# Patient Record
Sex: MALE | Race: OTHER | URBAN - METROPOLITAN AREA
[De-identification: names, ages, dates, MRNs, and addresses within clinical notes are randomized per-mention and may not be internally consistent; named-entity substitution may affect disease eponyms.]

---

## 2017-02-11 ENCOUNTER — OFFICE VISIT (OUTPATIENT)
Dept: FAMILY MEDICINE CLINIC | Age: 52
End: 2017-02-11

## 2017-02-11 VITALS
BODY MASS INDEX: 25.95 KG/M2 | HEART RATE: 91 BPM | WEIGHT: 152 LBS | HEIGHT: 64 IN | DIASTOLIC BLOOD PRESSURE: 76 MMHG | SYSTOLIC BLOOD PRESSURE: 108 MMHG | TEMPERATURE: 98.6 F

## 2017-02-11 DIAGNOSIS — R73.9 ELEVATED BLOOD SUGAR: ICD-10-CM

## 2017-02-11 DIAGNOSIS — E11.9 TYPE 2 DIABETES MELLITUS WITHOUT COMPLICATION, WITHOUT LONG-TERM CURRENT USE OF INSULIN (HCC): Primary | ICD-10-CM

## 2017-02-11 LAB — GLUCOSE POC: NORMAL MG/DL

## 2017-02-11 RX ORDER — GLIPIZIDE 10 MG/1
10 TABLET ORAL 2 TIMES DAILY
Qty: 180 TAB | Refills: 0 | Status: SHIPPED | OUTPATIENT
Start: 2017-02-11 | End: 2017-04-28 | Stop reason: SDUPTHER

## 2017-02-11 RX ORDER — METFORMIN HYDROCHLORIDE 1000 MG/1
1000 TABLET ORAL 2 TIMES DAILY WITH MEALS
Qty: 180 TAB | Refills: 0 | Status: SHIPPED | OUTPATIENT
Start: 2017-02-11 | End: 2017-10-19 | Stop reason: SDUPTHER

## 2017-02-11 NOTE — PATIENT INSTRUCTIONS
Aprenda acerca de las pautas alimentarias para diabetes - [ Learning About Diabetes Food Guidelines ]  Instrucciones de cuidado  La planificación de las comidas es importante para el manejo de la diabetes. Ayuda a mantener el azúcar en la jong en el nivel ideal (que usted fija con hensley médico). Usted no tiene que comer alimentos especiales. Puede comer lo mismo que hensley ramandeep, incluso dulces de vez en cuando. Nazia debe prestar atención a la cantidad y la frecuencia con que come ciertos alimentos. Vj vez desee colaborar con un dietista o educador de diabetes certificado (CDE, por carlee siglas en inglés) para que le ayuden a planificar las comidas y los refrigerios. Un dietista o CDE también puede ayudarle a bajar de peso si jennifer es raji de carlee objetivos. ¿Qué debería saber acerca de ingerir carbohidratos? Manejar la cantidad de carbohidratos que ingiere es wenceslao parte importante de la alimentación saludable cuando tiene diabetes. Los carbohidratos se encuentran en muchos alimentos. · Sepa qué alimentos contienen carbohidratos. Y aprenda qué cantidad de carbohidratos contienen los diferentes alimentos. ¨ El pan, los cereales, la pasta y el arroz tienen aproximadamente 15 gramos de carbohidratos por porción. Wenceslao porción equivale a 1 rebanada de pan (1 onza o 28 g), ½ taza de cereal cocido o 1/3 de taza de pasta o arroz cocidos. ¨ Las frutas tienen 15 gramos de carbohidratos por porción. Arsenio Settle porción es 1 fruta fresca pequeña, kian wenceslao [place] o wenceslao naranja; ½ banana (plátano); ½ taza de fruta cocida o enlatada; ½ taza de jugo de fruta; 1 taza de melón o frambuesas; o 2 cucharadas de frutas secas. ¨ La leche y el yogur sin azúcar agregado tienen 15 gramos de carbohidratos por porción. Arsenio Settle porción es 1 taza de [place] o 2/3 taza de yogur sin azúcar agregado. ¨ Las verduras con almidón tienen 15 gramos de carbohidratos por porción.  Arsenio Settle porción es ½ taza de puré de papa o camote (batata, celsoiato); 1 taza de calabacín; ½ papa horneada pequeña; ½ taza de frijoles cocidos; o ½ taza de maíz (elote) o arvejas (chícharos) cocidos. · Aprenda cuántos carbohidratos debe consumir cada día y en cada comida. Un dietista o CDE le puede enseñar cómo llevar la cuenta de los carbohidratos que consume. A esto se le llama recuento de carbohidratos. · Si no está seguro de cómo [employer] gramos de carbohidratos, utilice el Método del [place] para planificar las comidas. Es wenceslao Larraine Cortes y rápida de asegurarse de que consuma comidas equilibradas. También le ayuda a distribuir los carbohidratos fay el día. ¨ Divida el plato por tipo de alimento. Llene medio plato con verduras sin almidón, ponga carne u otras proteínas en wenceslao cuarta parte del plato y granos o verduras con almidón en el último cuarto del plato. A esto puede agregarle un pequeño pedazo de fruta y 3 taza de [place] o yogur, según la cantidad de carbohidratos que deba consumir en wenceslao comida. · Trate de comer aproximadamente la misma cantidad de carbohidratos en cada comida. No \"reserve\" hensley cantidad diaria de carbohidratos para consumirlos en wenceslao raiza comida. · Las proteínas contienen muy pocos o nada de carbohidratos por porción. Los ejemplos de proteínas incluyen carne de res, Rosalinda heights, [place], [place], [place], tofu, Geoffrey-barre, requesón (\"cottage cheese\") y la mantequilla de cacahuate [place]). Wenceslao porción de carne son 3 onzas (85 g), lo cual es aproximadamente del tamaño de wenceslao baraja de naipes. Los ejemplos de porciones de sustitutos de la carne (equivalente a 1 onza o 28 g de carne) son 1/4 de taza de requesón, 1 huevo, 1 cucharada de [place] de cacahuate y ½ taza de tofu. ¿Cómo puede comer fuera y aún así comer de modo saludable? · Aprenda a calcular los tamaños de las porciones de alimentos que contienen carbohidratos. Si mide la comida en casa, será más fácil calcular la cantidad en wenceslao porción de comida de restaurante.   · Si el platillo que pide contiene demasiados carbohidratos (kian laz, maíz o frijoles al horno), pida un alimento bajo en carbohidratos en walker lugar. Pida wenceslao ensalada o verduras. · Si [place] insulina, revise walker azúcar en la jong antes y después de comer fuera para ayudarle a planear cuánto comer en el futuro. · Si usted come más carbohidratos de lo planeado en wenceslao comida, dé un paseo o vinita otro tipo de ejercicio. Refugio ayudará a reducir el azúcar en la jong. ¿Qué más debería saber? · Limite las grasas saturadas, kian la grasa de la carne y productos lácteos. Esta es wenceslao opción saludable, porque las personas que tienen diabetes tienen un mayor riesgo de enfermedades del corazón. Así que elija gerardo magros de carne y productos lácteos descremados o semidescremados. Utilice aceite de ham o de canola en lugar de mantequilla o manteca al cocinar. · No se salte comidas. Walker nivel de azúcar en la jong puede bajar demasiado si usted se salta comidas y michael insulina o ciertos medicamentos para la diabetes. · Consulte con walker médico antes de beber alcohol. El alcohol puede hacer que walker azúcar en la jong baje demasiado. El alcohol también puede causar wenceslao reacción adversa si usted michael ciertos medicamentos para la diabetes. La atención de seguimiento es wenceslao parte clave de walker tratamiento y seguridad. Asegúrese de hacer y acudir a todas las citas, y llame a walker médico si está teniendo problemas. También es wenceslao buena idea saber los resultados de los exámenes y mantener wenceslao lista de los medicamentos que michael. ¿Dónde puede encontrar más información en inglés? Diana Cabello a [email or web address]-shayla.info/. Lulu Contreras J155 en la búsqueda para aprender más acerca de \"Aprenda acerca de las pautas alimentarias para diabetes - [ Learning About Diabetes Food Guidelines ]. \"  Revisado: 23 mayo, 2016  Versión del contenido: 11.1  © 7151-9885 [employer].  Las instrucciones de cuidado fueron adaptadas bajo licencia por [employer] (which disclaims liability or warranty for this information). Si usted tiene Goodman Bantry afección médica o sobre estas instrucciones, siempre pregunte a hensley profesional de camacho. [facility] niega toda garantía o responsabilidad por hensley uso de esta información.

## 2017-02-11 NOTE — PROGRESS NOTES
Results for orders placed or performed in visit on 02/11/17   AMB POC GLUCOSE BLOOD, BY GLUCOSE MONITORING DEVICE   Result Value Ref Range    Glucose  nonfasting mg/dL     Coordination of Care  1. Have you been to the ER, urgent care clinic since your last visit? Hospitalized since your last visit? No    2. Have you seen or consulted any other health care providers outside of the [address] [place] [address] since your last visit? Include any pap smears or colon screening. No    Medications  Medication Reconciliation Performed: yes  Patient does need refills     Learning Assessment Complete?  yes

## 2017-02-11 NOTE — PROGRESS NOTES
Subjective:     Phoebe Her is a 54 y.o. male seen for follow up of diabetes. He also has diabetes. Diabetic Review of Systems - medication compliance: noncompliant much of the time, diabetic diet compliance: noncompliant much of the time, home glucose monitoring: is not performed, further diabetic ROS: no polyuria or polydipsia, no chest pain, dyspnea or TIA's, no numbness, tingling or pain in extremities. Other symptoms and concerns: Pt notes his vision has been poor for the last year. Has never been for dilated eye exam.     There is no problem list on file for this patient. There are no active problems to display for this patient. Current Outpatient Prescriptions   Medication Sig Dispense Refill    glipiZIDE (GLUCOTROL) 5 mg tablet Take 1 Tab by mouth two (2) times a day. michael 1 tab por la boca 2 veces al keaton 60 Tab 1    metFORMIN (GLUCOPHAGE) 1,000 mg tablet Take 1 Tab by mouth two (2) times daily (with meals). 180 Tab 1    naproxen (NAPROSYN) 500 mg tablet Take 1 Tab by mouth two (2) times daily (with meals). 30 Tab 1     No Known Allergies  Past Medical History   Diagnosis Date    Diabetes ([facility] [address]         Lab Results  Component Value Date/Time   Hemoglobin A1c 12.2 06/14/2016 10:51 AM   Glucose 334 06/14/2016 10:51 AM   Glucose  nonfasting 02/11/2017 02:54 PM   Creatinine 0.76 06/14/2016 10:51 AM      No results found for: CHOL, CHOLX, CHLST, CHOLV, HDL, LDL, DLDL, LDLC, DLDLP, TGL, TGLX, TRIGL, [id number], TRIGP, CHHD, CHHDX    Lab Results   Component Value Date/Time    Hemoglobin A1c 12.2 06/14/2016 10:51 AM         Review of Systems  Pertinent items are noted in HPI.     Objective:     Visit Vitals    /76 (BP 1 Location: Right arm, BP Patient Position: Sitting)    Pulse 91    Temp 98.6 °F (37 °C) (Oral)    Ht 5' 3.78\" (1.62 m)    Wt 152 lb (68.9 kg)    BMI 26.27 kg/m2     Appearance: alert, well appearing, and in no distress, oriented to person, place, and time, normal appearing weight and well hydrated. Exam: heart sounds normal rate, regular rhythm, normal S1, S2, no murmurs, rubs, clicks or gallops, chest clear, no hepatosplenomegaly  Lab review: orders written for new lab studies as appropriate; see orders. Assessment/Plan:     diabetes poorly controlled, needs further observation, needs improvement, patient poorly compliant. Diabetic issues reviewed with him: referral to Diabetic Education department, home glucose monitoring emphasized, all medications, side effects and compliance discussed carefully and long term diabetic complications discussed. ICD-10-CM ICD-9-CM    1. Type 2 diabetes mellitus without complication, without long-term current use of insulin (HCC) E11.9 250.00 glipiZIDE (GLUCOTROL) 10 mg tablet      metFORMIN (GLUCOPHAGE) 1,000 mg tablet      HEMOGLOBIN A1C WITH EAG      CBC W/O DIFF      METABOLIC PANEL, COMPREHENSIVE      TSH 3RD GENERATION      LIPID PANEL      MICROALBUMIN, UR, RAND W/ MICROALBUMIN/CREA RATIO      URINALYSIS W/ RFLX MICROSCOPIC   2. Elevated blood sugar R73.9 790.29 AMB POC GLUCOSE BLOOD, BY GLUCOSE MONITORING DEVICE     Strongly counseled Pt on effects of poorly controlled DM 2. 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 Will increase Glipizide to 10mg BID and refill Metformin. Indicated Pt would get 90 day suppy since he needed to follow up  In 4-6 weeks for repeat of blood sugar and return for fasting lab appt. Encouraged to scheduled RD appt. Discussed importance of small regular meals and taking Glipizide with food. Refer for eye exam at f/u appt. Pt expressed understanding of the treatment plan and repeated back instructions for medications, labs, referral to RD and follow-up.

## 2017-04-28 DIAGNOSIS — E11.9 TYPE 2 DIABETES MELLITUS WITHOUT COMPLICATION, WITHOUT LONG-TERM CURRENT USE OF INSULIN (HCC): ICD-10-CM

## 2017-05-01 RX ORDER — GLIPIZIDE 10 MG/1
TABLET ORAL
Qty: 60 TAB | Refills: 1 | Status: SHIPPED | OUTPATIENT
Start: 2017-05-01 | End: 2017-10-19 | Stop reason: SDUPTHER

## 2017-05-01 NOTE — TELEPHONE ENCOUNTER
Pt needs to be seen before any more refills approved. Sugars consistently > 300s at [address].

## 2017-05-02 NOTE — TELEPHONE ENCOUNTER
Pt called w/ assistance of [employer] phone  #[phone]. Informed the refill was sent to his [address] yesterday for Glipizide 1 month and 1 refill and he needs to return for f/u w/ medical provider. Pt stated he was unable to keep his Mar appt because of work and he is planning to try to come on Sat 5/ 6/17 to have labs drawn and see the provider . Reviewed CAV line start times vs clinic start times.

## 2017-05-06 ENCOUNTER — HOSPITAL ENCOUNTER (OUTPATIENT)
Dept: LAB | Age: 52
Discharge: HOME | End: 2017-05-06

## 2017-05-06 ENCOUNTER — CLINICAL SUPPORT (OUTPATIENT)
Dept: FAMILY MEDICINE CLINIC | Age: 52
End: 2017-05-06

## 2017-05-06 DIAGNOSIS — E11.9 TYPE 2 DIABETES MELLITUS WITHOUT COMPLICATION, WITHOUT LONG-TERM CURRENT USE OF INSULIN (HCC): ICD-10-CM

## 2017-05-06 DIAGNOSIS — E11.9 TYPE 2 DIABETES MELLITUS WITHOUT COMPLICATION, WITHOUT LONG-TERM CURRENT USE OF INSULIN (HCC): Primary | ICD-10-CM

## 2017-05-06 LAB
ALBUMIN SERPL BCP-MCNC: 4.1 G/DL (ref 3.5–5)
ALBUMIN/GLOB SERPL: 1.4 {RATIO} (ref 1.1–2.2)
ALP SERPL-CCNC: 100 U/L (ref 45–117)
ALT SERPL-CCNC: 51 U/L (ref 12–78)
ANION GAP BLD CALC-SCNC: 9 MMOL/L (ref 5–15)
APPEARANCE UR: CLEAR
AST SERPL W P-5'-P-CCNC: 14 U/L (ref 15–37)
BACTERIA URNS QL MICRO: NEGATIVE /HPF
BILIRUB SERPL-MCNC: 0.8 MG/DL (ref 0.2–1)
BILIRUB UR QL: NEGATIVE
BUN SERPL-MCNC: 14 MG/DL (ref 6–20)
BUN/CREAT SERPL: 21 (ref 12–20)
CALCIUM SERPL-MCNC: 8.8 MG/DL (ref 8.5–10.1)
CHLORIDE SERPL-SCNC: 104 MMOL/L (ref 97–108)
CHOLEST SERPL-MCNC: 161 MG/DL
CO2 SERPL-SCNC: 23 MMOL/L (ref 21–32)
COLOR UR: ABNORMAL
CREAT SERPL-MCNC: 0.67 MG/DL (ref 0.7–1.3)
CREAT UR-MCNC: 174 MG/DL
EPITH CASTS URNS QL MICRO: ABNORMAL /LPF
ERYTHROCYTE [DISTWIDTH] IN BLOOD BY AUTOMATED COUNT: 12.8 % (ref 11.5–14.5)
EST. AVERAGE GLUCOSE BLD GHB EST-MCNC: 240 MG/DL
GLOBULIN SER CALC-MCNC: 3 G/DL (ref 2–4)
GLUCOSE SERPL-MCNC: 196 MG/DL (ref 65–100)
GLUCOSE UR STRIP.AUTO-MCNC: >1000 MG/DL
HBA1C MFR BLD: 10 % (ref 4.2–6.3)
HCT VFR BLD AUTO: 43.2 % (ref 36.6–50.3)
HDLC SERPL-MCNC: 39 MG/DL
HDLC SERPL: 4.1 {RATIO} (ref 0–5)
HGB BLD-MCNC: 15.1 G/DL (ref 12.1–17)
HGB UR QL STRIP: NEGATIVE
HYALINE CASTS URNS QL MICRO: ABNORMAL /LPF (ref 0–5)
KETONES UR QL STRIP.AUTO: NEGATIVE MG/DL
LDLC SERPL CALC-MCNC: 77.8 MG/DL (ref 0–100)
LEUKOCYTE ESTERASE UR QL STRIP.AUTO: NEGATIVE
LIPID PROFILE,FLP: ABNORMAL
MCH RBC QN AUTO: 32 PG (ref 26–34)
MCHC RBC AUTO-ENTMCNC: 35 G/DL (ref 30–36.5)
MCV RBC AUTO: 91.5 FL (ref 80–99)
MICROALBUMIN UR-MCNC: 8.68 MG/DL
MICROALBUMIN/CREAT UR-RTO: 50 MG/G (ref 0–30)
NITRITE UR QL STRIP.AUTO: NEGATIVE
PH UR STRIP: 6.5 [PH] (ref 5–8)
PLATELET # BLD AUTO: 199 K/UL (ref 150–400)
POTASSIUM SERPL-SCNC: 4.3 MMOL/L (ref 3.5–5.1)
PROT SERPL-MCNC: 7.1 G/DL (ref 6.4–8.2)
PROT UR STRIP-MCNC: ABNORMAL MG/DL
RBC # BLD AUTO: 4.72 M/UL (ref 4.1–5.7)
RBC #/AREA URNS HPF: ABNORMAL /HPF (ref 0–5)
SODIUM SERPL-SCNC: 136 MMOL/L (ref 136–145)
SP GR UR REFRACTOMETRY: 1.02 (ref 1–1.03)
TRIGL SERPL-MCNC: 221 MG/DL (ref ?–150)
TSH SERPL DL<=0.05 MIU/L-ACNC: 0.8 UIU/ML (ref 0.36–3.74)
UROBILINOGEN UR QL STRIP.AUTO: 0.2 EU/DL (ref 0.2–1)
VLDLC SERPL CALC-MCNC: 44.2 MG/DL
WBC # BLD AUTO: 5.4 K/UL (ref 4.1–11.1)
WBC URNS QL MICRO: ABNORMAL /HPF (ref 0–4)

## 2017-05-06 PROCEDURE — 80061 LIPID PANEL: CPT | Performed by: NURSE PRACTITIONER

## 2017-05-06 PROCEDURE — 85027 COMPLETE CBC AUTOMATED: CPT | Performed by: NURSE PRACTITIONER

## 2017-05-06 PROCEDURE — 82043 UR ALBUMIN QUANTITATIVE: CPT | Performed by: NURSE PRACTITIONER

## 2017-05-06 PROCEDURE — 84443 ASSAY THYROID STIM HORMONE: CPT | Performed by: NURSE PRACTITIONER

## 2017-05-06 PROCEDURE — 81001 URINALYSIS AUTO W/SCOPE: CPT | Performed by: NURSE PRACTITIONER

## 2017-05-06 PROCEDURE — 80053 COMPREHEN METABOLIC PANEL: CPT | Performed by: NURSE PRACTITIONER

## 2017-05-06 PROCEDURE — 83036 HEMOGLOBIN GLYCOSYLATED A1C: CPT | Performed by: NURSE PRACTITIONER

## 2017-05-06 NOTE — PROGRESS NOTES
Patient here for fasting labs only, labs drawn was an Urinalysis w/rflx micro, Cmp, Microalbumin, Lipid, Tsh, A1C, CBC w/o diff in the RA, patient left lab with no bleeding, no pain, and feeling well. Patient was advise that a Dr or Nurse will call with the results if abnormal and if normal no phone call will be done. Also the patient was advised she/he can discuss the results at next visit with the Dr. Vaishnavi Lamas, Medical Assistant.

## 2017-05-10 NOTE — PROGRESS NOTES
A1C improved  from 12 last year but still not within goal at 10.0. Diabetes needs improvement. Pt does not have f/u appt scheduled as was mentioned during RN phone call on 5/2 re: refills. Please have Pt return in next 4-6 weeks for DM 2 follow-up.  ,  Thank you,  Melisa Peterson

## 2017-05-12 NOTE — PROGRESS NOTES
I called pt today with [employer]  # [phone] and gave message from provider. Pt wants to return on a Saturday and that is why he did not schedule an appointment. He will return in 4 to 6 weeks for follow up for DM. I did review A1C labs with pt today.  Prema Lerma RN

## 2017-10-19 ENCOUNTER — CLINICAL SUPPORT (OUTPATIENT)
Dept: FAMILY MEDICINE CLINIC | Age: 52
End: 2017-10-19

## 2017-10-19 DIAGNOSIS — E11.9 TYPE 2 DIABETES MELLITUS WITHOUT COMPLICATION, WITHOUT LONG-TERM CURRENT USE OF INSULIN (HCC): ICD-10-CM

## 2017-10-19 DIAGNOSIS — Z71.9 COUNSELED BY NURSE: Primary | ICD-10-CM

## 2017-10-19 RX ORDER — METFORMIN HYDROCHLORIDE 1000 MG/1
1000 TABLET ORAL 2 TIMES DAILY WITH MEALS
Qty: 180 TAB | Refills: 0 | Status: SHIPPED | OUTPATIENT
Start: 2017-10-19 | End: 2019-06-16 | Stop reason: SDUPTHER

## 2017-10-19 RX ORDER — GLIPIZIDE 10 MG/1
10 TABLET ORAL 2 TIMES DAILY
Qty: 180 TAB | Refills: 0 | Status: SHIPPED | OUTPATIENT
Start: 2017-10-19 | End: 2019-06-16

## 2017-10-19 NOTE — TELEPHONE ENCOUNTER
Chart review shows last seen 2/2017 w/ recommendations to return 1-2 months. Will do refills and instructed to schedule a f/u appt. Pt made the line early today but was on waiting and needed to go to work at 1p. Has scheduled f/u appt.

## 2017-10-19 NOTE — PROGRESS NOTES
See refill note. Made line this am , placed on wait list and needs to go to work (1p now). Refills done for 3 months and f/u appt scheduled for Nov 2017. Encouraged to see provider at least every 6 months.

## 2017-11-16 ENCOUNTER — OFFICE VISIT (OUTPATIENT)
Dept: FAMILY MEDICINE CLINIC | Age: 52
End: 2017-11-16

## 2017-11-16 VITALS
TEMPERATURE: 98.2 F | HEIGHT: 64 IN | WEIGHT: 160 LBS | HEART RATE: 86 BPM | SYSTOLIC BLOOD PRESSURE: 114 MMHG | DIASTOLIC BLOOD PRESSURE: 75 MMHG | BODY MASS INDEX: 27.31 KG/M2

## 2017-11-16 DIAGNOSIS — E11.9 TYPE 2 DIABETES MELLITUS WITHOUT COMPLICATION, WITHOUT LONG-TERM CURRENT USE OF INSULIN (HCC): Primary | ICD-10-CM

## 2017-11-16 LAB — GLUCOSE POC: NORMAL MG/DL

## 2017-11-16 NOTE — MR AVS SNAPSHOT
Visit Information [facility] y Yamel Personal Médico Departamento Teléfono del Dep. Número de visita 11/16/2017  2:00 PM Erinn Hdez MD [address] [phone] Follow-up Instructions Return for nutr and nurse start insulin asap, provider 1 month. Upcoming Health Maintenance Date Due  
 FOOT EXAM Q1 5/21/1977 EYE EXAM RETINAL OR DILATED Q1 5/21/1977 Pneumococcal 19-64 Medium Risk (1 of 1 - PPSV23) 5/21/1986 DTaP/Tdap/Td series (1 - Tdap) 5/21/1988 FOBT Q 1 YEAR AGE 50-75 5/21/2017 Influenza Age 5 to Adult 8/1/2017 HEMOGLOBIN A1C Q6M 11/6/2017 MICROALBUMIN Q1 5/6/2018 LIPID PANEL Q1 5/6/2018 Alergias  Review Complete El: 11/16/2017 Por: Gareth Shelley A partir del:  11/16/2017 No Known Allergies Vacunas actuales Marla Sing No hay ninguna vacuna archivada. No revisadas esta visita You Were Diagnosed With   
  
 Manfred Milligan Type 2 diabetes mellitus without complication, without long-term current use of insulin (HCC)    -  Primary ICD-10-CM: E11.9 ICD-9-CM: 250.00 Partes vitales PS Pulso Temperatura [place] ( percentil de crecimiento) Peso (percentil de crecimiento) BMI (IMC)  
 114/75 (BP 1 Location: Right arm) 86 98.2 °F (36.8 °C) (Oral) 5' 3.78\" (1.62 m) 160 lb (72.6 kg) 27.65 kg/m2 Estatus de tabaquísmo Never Smoker Historial de signos vitales BMI and BSA Data Body Mass Index Body Surface Area  
 27.65 kg/m 2 1.81 m 2 [facility] Name Phone [facility], [address] [place] [address] Walker lista de medicamentos actualizada Lista actualizada el: 11/16/17  3:04 PM.  Jennifer Peterson use walker lista de medicamentos más reciente. glipiZIDE 10 mg tablet También conocido kian:  Medina Juliette Take 1 Tab by mouth two (2) times a day. metFORMIN 1,000 mg tablet También conocido kian:  GLUCOPHAGE Take 1 Tab by mouth two (2) times daily (with meals). Hicimos lo siguiente AMB POC GLUCOSE BLOOD, BY GLUCOSE MONITORING DEVICE [13587 CPT(R)] Instrucciones de seguimiento Return for nutr and nurse start insulin asap, provider 1 month. Introducing [facility]! [facility] introduce portal paciente MyChart . Ahora se puede acceder a partes de hensley expediente médico, enviar por correo electrónico la oficina de hensley médico y solicitar renovaciones de medicamentos en línea. En hensley navegador de Internet , Jair Sanchez a [email or web address] [place] clmoses en el usuario por Meghan Gilliland? Salma clay aquí en la sesión Brandi Mcneill. Verá la página de registro [place]. Ingrese hensley código de [facility] lydia y kian aparece a continuación. [place] no tendrá que [employer] código después de bhavik completado el proceso de registro . Si usted no se inscribe antes de la fecha de caducidad , debe solicitar un nuevo código. · MyChart Código de acceso : [id number] Expires: 2/14/2018  3:04 PM 
 
Ingresa los últimos cuatro dígitos de hensley Número de Seguro Social ( xxxx ) y fecha de nacimiento ( dd / mm / aaaa ) kian se indica y vinita clic en Enviar. Cruzito será llevado a la siguiente página de registro . Crear un ID MyChart . Esta será hensley ID de inicio de sesión de MyChart y no puede ser [place] , por lo que pensar en wenceslao que es Wesley Funez y fácil de recordar . Crear wenceslao contraseña MyChart . [place] puede cambiar hensley contraseña en cualquier momento . Ingrese hensley Password Reset de preguntas y [place] . Pelican se puede utilizar en un momento posterior si usted olvida hensley contraseña. Introduzca hensley dirección de correo electrónico . Migel Leahy recibirá wenceslao notificación por correo electrónico cuando la nueva información está disponible en MyChart . Dedra clay en Registrarse. Magdalene Smiles lucía y descargar porciones de hensley expediente médico. 
Alberta obed en el enlace de descarga del menú Resumen para descargar wenceslao copia portátil de hensley información médica . Si tiene [employer] , por favor visite la sección de preguntas frecuentes del sitio web MyChart . Recuerde, MyChart NO es que se utilizará para las necesidades urgentes. Para emergencias médicas , llame al 911 . Ahora disponible en hensley iPhone y Android ! Por favor proporcione imelda resumen de la documentación de cuidado a hensley próximo proveedor. If you have any questions after today's visit, please call [phone].

## 2017-11-16 NOTE — PROGRESS NOTES
Statements below were documented by Juan Carlos Metzger RN My  for this patient visit was Lissy Santo . Patient discharged home with AVS  . No further questions. Explained and showed patient how to draw up saline and inject himself in abdomen . Patient was able to perform self administering of saline without any problems . Booklet given on getting started with insulin in Marshallese . Sent to register to schedule NV for more detailed insulin teaching at later date . PAP application given and reviewed with patient financial documents required in order for submission to  . Patient stated I currently do not work , Informed to bring in wife pay stubs . Follow-up appointments made for Provider and dietician  as well .  Juan Carlos Metzger RN

## 2017-11-16 NOTE — PROGRESS NOTES
Subjective:     Abi Parsons is a 50 y.o. male seen for follow up of diabetes. States that when he takes his meds, it causes pain in his legs. Describes the pain as a sharp pain bilat calves to feet. Review of Systems - medication compliance: compliant most of the time, acute symptoms are absent. Currently taking meds except hasn't picked up lisinopril. .    Other symptoms and concerns: none. Lab Results   Component Value Date/Time    Hemoglobin A1c 10.0 05/06/2017 10:43 AM    Hemoglobin A1c 12.2 06/14/2016 10:51 AM         Objective:     Vitals 11/16/2017 2/11/2017 7/18/2016 6/14/2016   Blood Pressure 114/75 108/76 112/72 117/79   Pulse 86 91 76 75   Temp 98.2 98.6 97.5 97.6   Height 5' 3.78\" 5' 3.78\" 5' 3.858\" -   Weight 160 lb 152 lb 150 lb 153 lb   BSA 1.81 m2 1.76 m2 1.75 m2 -   BMI 27.65 kg/m2 26.27 kg/m2 25.86 kg/m2 -     Appearance: alert, well appearing, and in no distress. Exam: feet are without lesions and has good pulses. Lab review: A1Cs very high on max effective po meds. Assessment/Plan:     Poorly- controlled diabetes. I recommend he start insulin, 10U NPH at hs and continue current po meds. He has 2 concerns:  Taking the injection itself and how to pay for insulin. Discussed that we can probably get the insulin for him free (he isn't working right now), and will have the nurse show him injection today. We also discussed that he would need to check glucoses daily at first, and maybe later a little less often. Asked about the glucometer we rec, and we discussed this. Will have him come in for teaching re diet and insulin and f/u with provider in 1 month.

## 2017-11-16 NOTE — PROGRESS NOTES
Coordination of Care  1. Have you been to the ER, urgent care clinic since your last visit? Hospitalized since your last visit? No    2. Have you seen or consulted any other health care providers outside of the [address] [place] [address] since your last visit? Include any pap smears or colon screening. No    Medications  Does the patient need refills?  YES    Learning Assessment Complete? yes  Results for orders placed or performed in visit on 11/16/17   AMB POC GLUCOSE BLOOD, BY GLUCOSE MONITORING DEVICE   Result Value Ref Range    Glucose POC nf 324 mg/dL

## 2018-02-17 ENCOUNTER — OFFICE VISIT (OUTPATIENT)
Dept: FAMILY MEDICINE CLINIC | Age: 53
End: 2018-02-17

## 2018-02-17 VITALS
SYSTOLIC BLOOD PRESSURE: 128 MMHG | WEIGHT: 151 LBS | HEART RATE: 88 BPM | HEIGHT: 63 IN | TEMPERATURE: 98.7 F | BODY MASS INDEX: 26.75 KG/M2 | DIASTOLIC BLOOD PRESSURE: 72 MMHG

## 2018-02-17 DIAGNOSIS — E11.9 TYPE 2 DIABETES MELLITUS WITHOUT COMPLICATION, WITHOUT LONG-TERM CURRENT USE OF INSULIN (HCC): Primary | ICD-10-CM

## 2018-02-17 PROBLEM — E11.21 TYPE 2 DIABETES WITH NEPHROPATHY (HCC): Status: ACTIVE | Noted: 2018-02-17

## 2018-02-17 LAB — GLUCOSE POC: NORMAL MG/DL

## 2018-02-17 NOTE — PROGRESS NOTES
Pt c/o during clinic about long wait time to numerous staff. Charge nurse was called to see pt and reviewed clinic flow that was discussed in morning announcements w/ clients. Pt informed he must wait patiently and will be seen in order of arrival in line. Radha Barrios further disruptions will result in his dismissal from clinic\". AVS printed and handed to pt.

## 2018-02-17 NOTE — PROGRESS NOTES
Coordination of Care  1. Have you been to the ER, urgent care clinic since your last visit? Hospitalized since your last visit? No    2. Have you seen or consulted any other health care providers outside of the [address] [place] [address] since your last visit? Include any pap smears or colon screening. No    Medications  Does the patient need refills?  YES    Learning Assessment Complete? yes  Results for orders placed or performed in visit on 02/17/18   AMB POC GLUCOSE BLOOD, BY GLUCOSE MONITORING DEVICE   Result Value Ref Range    Glucose POC H non fasting mg/dL

## 2018-02-17 NOTE — PATIENT INSTRUCTIONS
Measure fasting glucoses every morning for 3 days. Use 25 units sc 30 minutes before breakfast and dinner. If any glucoses are below 80, drink 4 ounces juice, wait 15 minutes, measure again. Then if not time to eat a meal, eat a balanced snack.   Lay Ruffin  Hemoglobin A1c   Date Value Ref Range Status   05/06/2017 10.0 (H) 4.2 - 6.3 % Final   06/14/2016 12.2 (H) 4.2 - 6.3 % Final     META: A1c menos de 7.0 (154)    En ayunas (antes de desayuno) META:   2 horas despues de comer META: Menos de 180  Los chekeos de la Glucosa para [address] para checkiar la glucosa - Dág             $16.24     $9.00    A [employer]  ReliOn Prime Meter:  $16.24  Tiritas: 50 tiritas cuestan $9  ReliOn lancing device $5.84  ReliOn lancets 100 for $3.74, 200 for $5.89  ReliOn insulin syringes $12.58 for 100 (necesita receta para jeringes)  ReliOn Glucose Tablets   $1 for a pack of 10 tablets  BD Home Sharps Container $2.96    A [employer]    [employer]ger High Accuracy Glucometer $9.99  Tiritas: 50 tiritas cuestan $18.99  Lancets, 100 cuestan $ 8.99     Updated :  4/7/2014

## 2018-02-17 NOTE — MR AVS SNAPSHOT
[address]lla [address] 1400 [address] [place] [address] 
[phone] Patient: Nestor Newby MRN: [id number] :[id number] Visit Information Adele Demarco Personal Médico Departamento Teléfono del Dep. Número de visita 2018  1:45 PM Yuly Santos, [address] .S.  [phone] Follow-up Instructions Return for diabetes. Upcoming Health Maintenance Date Due  
 FOOT EXAM Q1 1977 EYE EXAM RETINAL OR DILATED Q1 1977 Pneumococcal 19-64 Medium Risk (1 of 1 - PPSV23) 1986 DTaP/Tdap/Td series (1 - Tdap) 1988 FOBT Q 1 YEAR AGE 50-75 2017 Influenza Age 5 to Adult 2017 HEMOGLOBIN A1C Q6M 2017 MICROALBUMIN Q1 2018 LIPID PANEL Q1 2018 Alergias  Review Complete El: 2018 Por: Yuly Santos, KRISTIAN Serrano Duel del:  2018 No Known Allergies Vacunas actuales Jeffrie Plan No hay ninguna vacuna archivada. No revisadas esta visita You Were Diagnosed With   
  
 Kiana Woods Type 2 diabetes mellitus without complication, without long-term current use of insulin (HCC)    -  Primary ICD-10-CM: E11.9 ICD-9-CM: 250.00 Partes vitales PS Pulso Temperatura Collins ( percentil de crecimiento) Peso (percentil de crecimiento) BMI (IMC)  
 128/72 (BP 1 Location: Right arm, BP Patient Position: Sitting) 88 98.7 °F (37.1 °C) (Oral) 5' 3.39\" (1.61 m) 151 lb (68.5 kg) 26.42 kg/m2 Estatus de tabaquísmo Never Smoker Historial de signos vitales BMI and BSA Data Body Mass Index Body Surface Area  
 26.42 kg/m 2 1.75 m 2 [facility] Name Phone [address] [facility], [address] [place] [address] JUVENAL Boyd [facility] [phone] Walker lista de medicamentos actualizada Lista actualizada el: 18  2:52 PM.  Rosibel Alegre use walker lista de medicamentos más reciente. glipiZIDE 10 mg tablet También conocido kian:  Ples Jenkins Take 1 Tab by mouth two (2) times a day. insulin NPH/insulin regular 100 unit/mL (70-30) injection También conocido kian:  NOVOLIN 70/30, HUMULIN 70/30  
25 units sc 30 min ac lunch and dinner  
  
 metFORMIN 1,000 mg tablet También conocido kian:  GLUCOPHAGE Take 1 Tab by mouth two (2) times daily (with meals). Recetas Enviado a la [place] Refills  
 insulin NPH/insulin regular (NOVOLIN 70/30, HUMULIN 70/30) 100 unit/mL (70-30) injection 3 Si units sc 30 min ac lunch and dinner Class: Normal  
 Pharmacy: [facility] [address], [address] [place] [address] #: [phone] Hicimos lo siguiente AMB POC GLUCOSE BLOOD, BY GLUCOSE MONITORING DEVICE [81568 CPT(R)] Instrucciones de seguimiento Return for diabetes. Instrucciones para el Paciente Measure fasting glucoses every morning for 3 days. Use 25 units sc 30 minutes before breakfast and dinner. If any glucoses are below 80, drink 4 ounces juice, wait 15 minutes, measure again. Then if not time to eat a meal, eat a balanced snack. Yuko Mcneil Hemoglobin A1c Date Value Ref Range Status 2017 10.0 (H) 4.2 - 6.3 % Final  
2016 12.2 (H) 4.2 - 6.3 % Final  
 
META: A1c menos de 7.0 (154) En ayunas (antes de desayuno) META:  
2 horas despues de comer META: Menos de [address] [place] para checkiar la glucosa - [employer] 
 
 
   
   $16.24     $9.00 A [facility] ReliOn Prime Meter:  $16.24 Tiritas: 50 tiritas cuestan $9 
ReliOn lancing device $5.84 ReliOn lancets 100 for $3.74, 200 for $5.89 ReliOn insulin syringes $12.58 for 100 (necesita receta para jeringes) ReliOn Glucose Tablets   $1 for a pack of 10 tablets [employer] Home Sharps Container $2.96 Lois Farris Celine High Accuracy Glucometer $9.99 Tiritas: 50 tiritas cuestan $18.99 Lancets, 100 cuestan $ 8.99 Updated :  4/7/2014 Introducing [facility] SERVICES! [facility] introduce portal paciente MyChart . Ahora se puede acceder a partes de hensley expediente médico, enviar por correo electrónico la oficina de hensley médico y solicitar renovaciones de medicamentos en línea. En hensley navegador de Internet , Sussy bauer [email or web address] [place] clic en el usuario por Kati Le? Luda Sudhakar clic aquí en la sesión Zina Pallas. Verá la página de registro Denair. Ingrese hensley código de [facility] Nimisha lydia y kian aparece a continuación. Usted no tendrá que [employer] código después de bhavik completado el proceso de registro . Si usted no se inscribe antes de la fecha de caducidad , debe solicitar un nuevo código. · MyChart Código de acceso : [id number] Expires: 5/18/2018  1:02 PM 
 
Ingresa los últimos cuatro dígitos de hensley Número de Seguro Social ( xxxx ) y fecha de nacimiento ( dd / mm / aaaa ) kian se indica y vinita clic en Enviar. Usted será llevado a la siguiente página de registro . Crear un ID MyChart . Esta será hensley ID de inicio de sesión de MyChart y no puede ser [place] , por lo que pensar en wenceslao que es [place] y fácil de recordar . Crear wenceslao contraseña MyChart . Usted puede cambiar hensley contraseña en cualquier momento . Ingrese hensley Password Reset de preguntas y [place] . [place] se puede utilizar en un momento posterior si usted olvida hensley contraseña. Introduzca hensley dirección de correo electrónico . Darrelyn Dienes recibirá wenceslao notificación por correo electrónico cuando la nueva información está disponible en MyChart . Martita paulinoic en Registrarse. David Bark lucía y descargar porciones de hensley expediente médico. 
Vinita loraic en el enlace de descarga del menú Resumen para descargar wenceslao copia portátil de hensley información médica . Si tiene [employer] , por favor visite la sección de preguntas frecuentes del sitio web MyChart . Recuerde, MyChart NO es que se utilizará para las necesidades urgentes. Para emergencias médicas , llame al 911 . Ahora disponible en hensley iPhone y Android ! Por favor proporcione imelda resumen de la documentación de cuidado a hensley próximo proveedor. If you have any questions after today's visit, please call [phone].

## 2018-02-17 NOTE — PROGRESS NOTES
Assessment/Plan:       ICD-10-CM ICD-9-CM    1. Type 2 diabetes mellitus without complication, without long-term current use of insulin (HCC) E11.9 250.00 AMB POC GLUCOSE BLOOD, BY GLUCOSE MONITORING DEVICE      insulin NPH/insulin regular (NOVOLIN 70/30, HUMULIN 70/30) 100 unit/mL (70-30) injection      Follow-up Disposition:  Return for diabetes. [address], [address]  [address] [place] [address]  Phone: [phone] Fax: [phone]    [facility]  Subjective:     Chief Complaint   Patient presents with    Diabetes     follow up    Medication Refill    Ruthy Giles is a 50 y.o. OTHER male   Diabetes   Brought in medications? no Last ate: today   Last took medications: today Taking as prescribed? yes   Working: yes   Physical Activity? yes  Measuring glucoses? yes   Agreeable to starting insulin. Outpatient Medications Prior to Visit   Medication Sig Dispense Refill    glipiZIDE (GLUCOTROL) 10 mg tablet Take 1 Tab by mouth two (2) times a day. 180 Tab 0    metFORMIN (GLUCOPHAGE) 1,000 mg tablet Take 1 Tab by mouth two (2) times daily (with meals). 180 Tab 0     No facility-administered medications prior to visit. ROS:   no polyuria or polydipsia, no chest pain, dyspnea or TIA's, no numbness, tingling or pain in extremities. Social History: He reports that he has never smoked. He has never used smokeless tobacco. He reports that he does not drink alcohol or use illicit drugs. Family History: His family history includes Diabetes in his brother, maternal uncle, and another family member; Heart Attack in his maternal uncle; Kidney Disease in his brother. Objective:     Vitals:    02/17/18 1327   BP: 128/72   Pulse: 88   Temp: 98.7 °F (37.1 °C)   TempSrc: Oral   Weight: 151 lb (68.5 kg)   Height: 5' 3.39\" (1.61 m)    No LMP for male patient.     Results for orders placed or performed in visit on 02/17/18 AMB POC GLUCOSE BLOOD, BY GLUCOSE MONITORING DEVICE   Result Value Ref Range    Glucose POC [facility] non fasting mg/dL      Wt Readings from Last 2 Encounters:   02/17/18 151 lb (68.5 kg)   11/16/17 160 lb (72.6 kg)    Weight decreased; Hemoglobin A1c   Date Value Ref Range Status   05/06/2017 10.0 (H) 4.2 - 6.3 % Final   06/14/2016 12.2 (H) 4.2 - 6.3 % Final    A1C decreased; Constitutional: He appears well-developed. Eyes: EOM are normal. Pupils are equal, round, and reactive to light. Neck: Neck supple. No thyromegaly present. Cardiovascular: Normal rate, regular rhythm, normal heart sounds and intact distal pulses. No murmur heard. Pulmonary: Effort normal and breath sounds normal.   Musculoskeletal: He exhibits no edema. No ulcers of the lower extremities. Assessment/Plan:   Diagnoses and all orders for this visit:    1. Type 2 diabetes mellitus without complication, without long-term current use of insulin ([facility])  -     AMB POC GLUCOSE BLOOD, BY GLUCOSE MONITORING DEVICE  -     insulin NPH/insulin regular (NOVOLIN 70/30, HUMULIN 70/30) 100 unit/mL (70-30) injection; 25 units sc 30 min ac lunch and dinner    Okra and a tea. Diabetes Mellitus type 2, under Poor control. Blood pressure under Good control. Follow-up Disposition:  Return for diabetes. Nils Chavez, [place], FNP-BC, BC-ADM  Cecille Kearns expressed understanding of this plan.

## 2018-04-12 ENCOUNTER — OFFICE VISIT (OUTPATIENT)
Dept: FAMILY MEDICINE CLINIC | Age: 53
End: 2018-04-12

## 2018-04-12 VITALS
WEIGHT: 170 LBS | HEIGHT: 63 IN | DIASTOLIC BLOOD PRESSURE: 79 MMHG | TEMPERATURE: 98.9 F | SYSTOLIC BLOOD PRESSURE: 138 MMHG | BODY MASS INDEX: 30.12 KG/M2 | HEART RATE: 92 BPM

## 2018-04-12 DIAGNOSIS — E11.9 TYPE 2 DIABETES MELLITUS WITHOUT COMPLICATION, WITHOUT LONG-TERM CURRENT USE OF INSULIN (HCC): Primary | ICD-10-CM

## 2018-04-12 DIAGNOSIS — B02.9 HERPES ZOSTER WITHOUT COMPLICATION: ICD-10-CM

## 2018-04-12 LAB — GLUCOSE POC: NORMAL MG/DL

## 2018-04-12 RX ORDER — ACYCLOVIR 400 MG/1
800 TABLET ORAL
Qty: 70 TAB | Refills: 0 | Status: SHIPPED | OUTPATIENT
Start: 2018-04-12 | End: 2018-04-19

## 2018-04-12 RX ORDER — NAPROXEN 500 MG/1
500 TABLET ORAL 2 TIMES DAILY WITH MEALS
Qty: 60 TAB | Refills: 0 | Status: SHIPPED | OUTPATIENT
Start: 2018-04-12

## 2018-04-12 NOTE — PATIENT INSTRUCTIONS
Herpes zóster (culebrilla): Instrucciones de cuidado - [ Shingles: Care Instructions ]  Instrucciones de cuidado    El herpes zóster, o culebrilla, provoca dolor y salpullido con ampollas. El salpullido puede aparecer en cualquier sitio del cuerpo, ulises será solo en un lado del cuerpo, el lado shelly o el derecho. Se presenta en wenceslao ward, en wenceslao franja o en wenceslao [facility]. El dolor puede ser muy intenso. El herpes zóster también puede provocar hormigueo o picazón en la kaitlyn del salpullido. Las ampollas lorie costras después de unos días y sanan al cabo de 2 a 4 semanas. Los medicamentos pueden ayudarle a sentirse mejor y podrían prevenir problemas más graves causados por el herpes zóster. El herpes zóster lo causa el mismo virus que causa la varicela. Cuando tiene varicela, el virus entra en las raíces nerviosas y permanece allí (se mantiene latente) mucho tiempo después de que se haya recuperado de la varicela. Si el virus vuelve a activarse, puede provocar herpes zóster. La atención de seguimiento es wenceslao parte clave de hensley tratamiento y seguridad. Asegúrese de hacer y acudir a todas las citas, y llame a hensley médico si está teniendo problemas. También es wenceslao buena idea saber los resultados de los exámenes y mantener wenceslao lista de los medicamentos que michael. ¿Cómo puede cuidarse en el hogar? · Sea iraj con los medicamentos. [employer] medicamentos exactamente kian le fueron recetados. Llame a hensley médico si chetna estar teniendo un problema con hensley medicamento. Los medicamentos antivirales le ayudan a mejorarse más rápido. · Trate de no hurgarse ni rascarse las ampollas. Se formarán costras y se caerán por sí solas si no las toca. · Colóquese paños fríos húmedos sobre la kaitlyn para aliviar el dolor y la picazón. También puede utilizar wenceslao loción de calamina. Trate de no usar demasiada loción para que no se empaste y resulte difícil de quitar.   · Póngase fécula de maíz ([address]) o bicarbonato de sodio en las llagas para que se sequen y sanen más rápido. · No utilice wenceslao pomada espesa, kian vaselina, en las llagas. [place] impedirá que se sequen y sanen. · Para ayudar a retirar las costras sueltas, sumérjalas en agua del grifo. [place] puede ayudar a reducir la supuración, además de secar y aliviar la piel. · [place] un analgésico (medicamento para el dolor) de venta nicolle, kian acetaminofén (Tylenol), ibuprofeno (Advil, Motrin) o naproxeno (Aleve). Daysi y siga todas las instrucciones de la [place]. · Evite el contacto cercano con otras personas hasta que hayan sanado las ampollas. Es muy importante que evite el contacto con personas que nunca hayan tenido varicela o que no hayan sido vacunadas contra la varicela. Las [employer], los bebés y las personas con problemas para combatir infecciones (kian personas con VIH, diabetes o cáncer) tienen un riesgo especialmente alto. ¿Cuándo debe pedir ayuda? Llame a hensley médico ahora mismo o busque atención médica inmediata si:  ? · Tiene fiebre nueva o más rachelle. ? · Tiene un dolor de rogelio intenso y rigidez en el lon. ? · Pierde la capacidad para pensar con claridad. ? · El salpullido se extiende a la frente, la nariz, los ojos o los párpados. ? · Tiene dolor de ojos o hensley Bunny Patience. ? · Tiene un dolor nuevo en la michelle o no puede  los músculos de la michelle. ? · [place] se extienden a otras partes del cuerpo. ?Preste especial atención a los cambios en hensley camacho y asegúrese de comunicarse con hensley médico si:  ? · El salpullido no cece al cabo de 2 a 4 semanas. ? · Tiene dolor aún después de que el salpullido haya sanado. ¿Dónde puede encontrar más información en inglés? Hernesto Rush a http://timoteo-shayla.info/. Juan Linares E973 en la búsqueda para aprender más acerca de \"Herpes zóster (culebrilla): Instrucciones de cuidado - [ Shingles: Care Instructions ]. \"  Revisado: 3 Clyde Heal 2017  Versión del contenido: 11.4  © 1270-8461 Healthwise, Incorporated. Las instrucciones de cuidado fueron adaptadas bajo licencia por [employer] (which disclaims liability or warranty for this information). Si usted tiene [place] afección médica o sobre estas instrucciones, siempre pregunte a hensley profesional de camacho. [employer] niega toda garantía o responsabilidad por hensley uso de esta información.

## 2018-04-12 NOTE — PROGRESS NOTES
Coordination of Care  1. Have you been to the ER, urgent care clinic since your last visit? Hospitalized since your last visit? No    2. Have you seen or consulted any other health care providers outside of the [address] [place] [address] since your last visit? Include any pap smears or colon screening. No    Does the patient need refills?  YES    Learning Assessment Complete? yes  Results for orders placed or performed in visit on 04/12/18   AMB POC GLUCOSE BLOOD, BY GLUCOSE MONITORING DEVICE   Result Value Ref Range    Glucose POC nf 167 mg/dL

## 2018-04-12 NOTE — PROGRESS NOTES
Assessment/Plan:    Diagnoses and all orders for this visit:    1. Type 2 diabetes mellitus without complication, without long-term current use of insulin ([facility])  -     AMB POC GLUCOSE BLOOD, BY GLUCOSE MONITORING DEVICE  -     insulin NPH/insulin regular (NOVOLIN 70/30, HUMULIN 70/30) 100 unit/mL (70-30) injection; 25 units sc 30 min ac lunch and dinner    2. Herpes zoster without complication  -     acyclovir (ZOVIRAX) 400 mg tablet; Take 2 Tabs by mouth five (5) times daily for 7 days. Indications: herpes zoster  -     naproxen (NAPROSYN) 500 mg tablet; Take 1 Tab by mouth two (2) times daily (with meals). For pain    19 lb weight gain in less than 2 months discussed at length with the pt- encouraged pt to get back into being physically active   I am treating him with acyclovir for his shingles even though it has been 5 days due to his immunocompromised state (DM not well controlled)      Follow-up Disposition:  Return in about 6 weeks (around 5/24/2018) for with Milan Beaulieu for diabetes . EDGARDO Dale expressed understanding of this plan. An AVS was printed and given to the patient.      ----------------------------------------------------------------------    Chief Complaint   Patient presents with    Diabetes     f/u    Rash     pt c/o rash on back x1 week       History of Present Illness:    Pt here for problem visit new pt to me so I have reviewed his chart  He was seen in 2/18 and since then he has started insulin at 25 units bid. He has gained 19 lbs (according to the scales today) since that appt. This was discussed with the pt extensively    He has a burning, painful rash on his back. This has been present for 5 days now. Past Medical History:   Diagnosis Date    Diabetes [facility])        Current Outpatient Prescriptions   Medication Sig Dispense Refill    acyclovir (ZOVIRAX) 400 mg tablet Take 2 Tabs by mouth five (5) times daily for 7 days.  Indications: herpes zoster 70 Tab 0    naproxen (NAPROSYN) 500 mg tablet Take 1 Tab by mouth two (2) times daily (with meals). For pain 60 Tab 0    insulin NPH/insulin regular (NOVOLIN 70/30, HUMULIN 70/30) 100 unit/mL (70-30) injection 25 units sc 30 min ac lunch and dinner 10 mL 3    glipiZIDE (GLUCOTROL) 10 mg tablet Take 1 Tab by mouth two (2) times a day. 180 Tab 0    metFORMIN (GLUCOPHAGE) 1,000 mg tablet Take 1 Tab by mouth two (2) times daily (with meals). 180 Tab 0       No Known Allergies    Social History   Substance Use Topics    Smoking status: Never Smoker    Smokeless tobacco: Never Used    Alcohol use No       Family History   Problem Relation Age of Onset    Diabetes Brother     Kidney Disease Brother      on dialysis    Diabetes Maternal Uncle     Heart Attack Maternal Uncle     Diabetes Other      maternal cousin       Physical Exam:     Visit Vitals    /79 (BP 1 Location: Right arm)    Pulse 92    Temp 98.9 °F (37.2 °C) (Oral)    Ht 5' 3.39\" (1.61 m)    Wt 170 lb (77.1 kg)    BMI 29.75 kg/m2       A&Ox3  WDWN NAD  Respirations normal and non labored  Skin- lumbar region most of rash is located mid left lumbar region not crossing the spine, it is comprised of clear fluid filled vesicles arranged in dermatomal pattern.  He has a much smaller area of similar appearing rash at same dermatome but on right back

## 2018-04-12 NOTE — MR AVS SNAPSHOT
[address] [place] [address] [address] [facility] 57 
[phone] Patient: Fox Hi MRN: [id number] Visit Information Derl Rj Montesinos Personal Médico Departamento Teléfono del Dep. Número de visita 2018  1:30 PM Meek Meraz 104, [phone] Surgeons  [phone] Follow-up Instructions Return in about 6 weeks (around 2018) for with [facility] for diabetes . Upcoming Health Maintenance Date Due  
 FOOT EXAM Q1 1977 EYE EXAM RETINAL OR DILATED Q1 1977 Pneumococcal 19-64 Medium Risk (1 of 1 - PPSV23) 1986 DTaP/Tdap/Td series (1 - Tdap) 1988 FOBT Q 1 YEAR AGE 50-75 2017 Influenza Age 5 to Adult 2017 HEMOGLOBIN A1C Q6M 2017 MICROALBUMIN Q1 2018 LIPID PANEL Q1 2018 Alergias  Review Complete El: 2018 Por: Nehemias Bass A partir del:  2018 No Known Allergies Vacunas actuales Bernadinerijennifere Peirce No hay ninguna vacuna archivada. No revisadas esta visita You Were Diagnosed With   
  
 Joe Weiner Type 2 diabetes mellitus without complication, without long-term current use of insulin (HCC)    -  Primary ICD-10-CM: E11.9 ICD-9-CM: 250.00 Herpes zoster without complication     GEP-90-EO: B02.9 ICD-9-CM: 053.9 Partes vitales PS Pulso Temperatura [place] ( percentil de crecimiento) Peso (percentil de crecimiento) BMI (IMC)  
 138/79 (BP 1 Location: Right arm) 92 98.9 °F (37.2 °C) (Oral) 5' 3.39\" (1.61 m) 170 lb (77.1 kg) 29.75 kg/m2 Estatus de tabaquísmo Never Smoker Historial de signos vitales BMI and BSA Data Body Mass Index Body Surface Area  
 29.75 kg/m 2 1.86 m 2 [facility] Name Phone  [facility], [address] [place] [address] 7661 JUVENAL Boyd [facility] [phone] Hensley lista de medicamentos actualizada Kate Madrigals actualizada 18  2:34 PM.  Augusto Odom use hensley lista de medicamentos más reciente. acyclovir 400 mg tablet También conocido kian:  ZOVIRAX Take 2 Tabs by mouth five (5) times daily for 7 days. Indications: herpes zoster  
  
 glipiZIDE 10 mg tablet También conocido kian:  Melissa Lat Take 1 Tab by mouth two (2) times a day. insulin NPH/insulin regular 100 unit/mL (70-30) injection También conocido kian:  NOVOLIN 70/30, HUMULIN 70/30  
25 units sc 30 min ac lunch and dinner  
  
 metFORMIN 1,000 mg tablet También conocido kian:  GLUCOPHAGE Take 1 Tab by mouth two (2) times daily (with meals). naproxen 500 mg tablet También conocido kian:  NAPROSYN Take 1 Tab by mouth two (2) times daily (with meals). For pain Recetas Enviado a la [place] Refills  
 acyclovir (ZOVIRAX) 400 mg tablet 0 Sig: Take 2 Tabs by mouth five (5) times daily for 7 days. Indications: herpes zoster Class: Normal  
 Pharmacy: [facility] [address], [address] Ph #: [phone] Route: Oral  
 naproxen (NAPROSYN) 500 mg tablet 0 Sig: Take 1 Tab by mouth two (2) times daily (with meals). For pain  
 Class: Normal  
 Pharmacy: [facility] [address], [address] Ph #: [phone] Route: Oral  
 insulin NPH/insulin regular (NOVOLIN 70/30, HUMULIN 70/30) 100 unit/mL (70-30) injection 3 Si units sc 30 min ac lunch and dinner Class: Normal  
 Pharmacy: [facility] [address], [address] Ph #: [phone] Hicimos lo siguiente AMB POC GLUCOSE BLOOD, BY GLUCOSE MONITORING DEVICE [28491 CPT(R)] Instrucciones de seguimiento Return in about 6 weeks (around 2018) for with [facility] for diabetes . Instrucciones para el Paciente Herpes zóster (culebrilla): Instrucciones de cuidado - [ Shingles: Care Instructions ] Instrucciones de cuidado El herpes zóster, o culebrilla, provoca dolor y salpullido con ampollas. El salpullido puede aparecer en cualquier sitio del cuerpo, ulises será solo en un lado del cuerpo, el lado shelly o el derecho. Se presenta en wenceslao ward, en wenceslao franja o en wenceslao [place]. El dolor puede ser muy intenso. El herpes zóster también puede provocar hormigueo o picazón en la kaitlyn del salpullido. Las ampollas lorie costras después de unos días y sanan al cabo de 2 a 4 semanas. Los medicamentos pueden ayudarle a sentirse mejor y podrían prevenir problemas más graves causados por el herpes zóster. El herpes zóster lo causa el mismo virus que causa la varicela. Cuando tiene varicela, el virus entra en las raíces nerviosas y permanece allí (se mantiene latente) mucho tiempo después de que se haya recuperado de la varicela. Si el virus vuelve a activarse, puede provocar herpes zóster. La atención de seguimiento es wenceslao parte clave de hensley tratamiento y seguridad. Asegúrese de hacer y acudir a todas las citas, y llame a hensley médico si está teniendo problemas. También es wenceslao buena idea saber los resultados de los exámenes y mantener wenceslao lista de los medicamentos que michael. Cómo puede cuidarse en el hogar? · Sea iraj con los medicamentos. [employer] medicamentos exactamente kian le fueron recetados. Llame a hensley médico si chetna estar teniendo un problema con hensley medicamento. Los medicamentos antivirales le ayudan a mejorarse más rápido. · Trate de no hurgarse ni rascarse las ampollas. Se formarán costras y se caerán por sí solas si no las toca. · Colóquese paños fríos húmedos sobre la kaitlyn para aliviar el dolor y la picazón. También puede utilizar wenceslao loción de calamina. Trate de no usar demasiada loción para que no se empaste y resulte difícil de quitar. · Póngase fécula de maíz (maicena) o bicarbonato de sodio en las llagas para que se sequen y sanen más rápido. · No utilice wenceslao pomada espesa, kian vaselina, en las llagas. [place] impedirá que se sequen y sanen. · Para ayudar a retirar las costras sueltas, sumérjalas en agua del grifo. [place] puede ayudar a reducir la supuración, además de secar y aliviar la piel. · Coates un analgésico (medicamento para el dolor) de venta nicolle, kian acetaminofén (Tylenol), ibuprofeno (Advil, Motrin) o naproxeno (Aleve). Daysi y siga todas las instrucciones de la [place]. · Evite el contacto cercano con otras personas hasta que hayan sanado las ampollas. Es muy importante que evite el contacto con personas que nunca hayan tenido varicela o que no hayan sido vacunadas contra la varicela. Las [employer], los bebés y las personas con problemas para combatir infecciones (kian personas con VIH, diabetes o cáncer) tienen un riesgo especialmente alto. Cuándo debe pedir ayuda? Llame a hensley médico ahora mismo o busque atención médica inmediata si: 
? · Tiene fiebre nueva o más rachelle. ? · Tiene un dolor de rogelio intenso y rigidez en el lon. ? · Pierde la capacidad para pensar con claridad. ? · El salpullido se extiende a la frente, la nariz, los ojos o los párpados. ? · Tiene dolor de ojos o hensley Paola Otoniel. ? · Tiene un dolor nuevo en la michelle o no puede  los músculos de la michelle. ? · The [place] se extienden a otras partes del cuerpo. ?Preste especial atención a los cambios en hensley camacho y asegúrese de comunicarse con hensley médico si: 
? · El salpullido no cece al cabo de 2 a 4 semanas. ? · Tiene dolor aún después de que el salpullido haya sanado. Dónde puede encontrar más información en inglés? Christian Marrufo a [email or web address]-shayla.[email or web address] [place] Spotted E293 en la búsqueda para aprender más acerca de \"Herpes zóster (culebrilla): Instrucciones de cuidado - [ Shingles: Care Instructions ]. \" [facility], 2017 Versión del contenido: 11.4 © 7852-0063 Healthwise, Incorporated. Las instrucciones de cuidado fueron adaptadas bajo licencia por Good Help Connections (which disclaims liability or warranty for this information). Si usted tiene [place] afección médica o sobre estas instrucciones, siempre pregunte a hensley profesional de camacho. Healthwise, Incorporated niega toda garantía o responsabilidad por hensley uso de esta información. Introducing [facility]! [facility] introduce portal paciente MyChart . Ahora se puede acceder a partes de hensley expediente médico, enviar por correo electrónico la oficina de hensley médico y solicitar renovaciones de medicamentos en línea. En hensley navegador de Internet , Gildaalyn Warren a [email or web address] [place] clic en el usuario por Emma Beckham? Alexei paulinoic aquí en la sesión Lauran Holstein. Verá la página de registro [place]. Ingrese hensley código de [facility] Nimisha lydia y kian aparece a continuación. Usted no tendrá que [employer] código después de bhavik completado el proceso de registro . Si usted no se inscribe antes de la fecha de caducidad , debe solicitar un nuevo código. · MyChart Código de acceso : [id number] Expires: 5/18/2018  2:02 PM 
 
Ingresa los últimos cuatro dígitos de hensley Número de Seguro Social ( xxxx ) y fecha de nacimiento ( dd / mm / aaaa ) kian se indica y vinita clic en Enviar. [place] será llevado a la siguiente página de registro . Crear un ID MyChart . Esta será hensley ID de inicio de sesión de MyChart y no puede ser [place] , por lo que pensar en wenceslao que es [place] y fácil de recordar . Crear wenceslao contraseña MyChart . Usted puede cambiar hensley contraseña en cualquier momento . Ingrese hensley Password Reset de preguntas y [place] . [place] se puede utilizar en un momento posterior si usted olvida hensley contraseña.  
Introduzca hensley dirección de correo electrónico . Mable Arora recibirá [facility] notificación por correo electrónico cuando la nueva información está disponible en MyChart . Creed [facility]s clic en Registrarse. Eda Herrera lucía y descargar porciones de hensley expediente médico. 
[place] clic en el enlace de descarga del menú Resumen para descargar wenceslao copia portátil de hensley información médica . Si tiene [employer] , por favor visite la sección de preguntas frecuentes del sitio web [employer] . Recuerde, [employer] NO es que se utilizará para las necesidades urgentes. Para emergencias médicas , llame al 911 . Ahora disponible en hensley iPhone y Android ! Por favor proporcione imelda resumen de la documentación de cuidado a hensley próximo proveedor. If you have any questions after today's visit, please call [phone].

## 2018-04-12 NOTE — PROGRESS NOTES
AVS printed and reviewed. Escripts discussed. Good Rx done for Acyclovir $24 at [facility]. PAP application given and discussed. Discussion assisted by YUMIKO [facility] [place]inder [place] , Yuri.

## 2019-06-15 ENCOUNTER — HOSPITAL ENCOUNTER (OUTPATIENT)
Dept: LAB | Age: 54
Discharge: HOME | End: 2019-06-15

## 2019-06-15 ENCOUNTER — OFFICE VISIT (OUTPATIENT)
Dept: FAMILY MEDICINE CLINIC | Age: 54
End: 2019-06-15

## 2019-06-15 VITALS
BODY MASS INDEX: 28.51 KG/M2 | DIASTOLIC BLOOD PRESSURE: 86 MMHG | HEIGHT: 64 IN | SYSTOLIC BLOOD PRESSURE: 137 MMHG | TEMPERATURE: 98.1 F | HEART RATE: 66 BPM | WEIGHT: 167 LBS

## 2019-06-15 DIAGNOSIS — L08.9 INFECTED SEBACEOUS CYST OF SKIN: Primary | ICD-10-CM

## 2019-06-15 DIAGNOSIS — Z13.9 ENCOUNTER FOR SCREENING: ICD-10-CM

## 2019-06-15 DIAGNOSIS — L72.3 INFECTED SEBACEOUS CYST OF SKIN: Primary | ICD-10-CM

## 2019-06-15 DIAGNOSIS — E11.9 TYPE 2 DIABETES MELLITUS WITHOUT COMPLICATION, WITHOUT LONG-TERM CURRENT USE OF INSULIN (HCC): ICD-10-CM

## 2019-06-15 LAB
ANION GAP SERPL CALC-SCNC: 7 MMOL/L (ref 5–15)
BUN SERPL-MCNC: 13 MG/DL (ref 6–20)
BUN/CREAT SERPL: 15 (ref 12–20)
CALCIUM SERPL-MCNC: 8.8 MG/DL (ref 8.5–10.1)
CHLORIDE SERPL-SCNC: 105 MMOL/L (ref 97–108)
CHOLEST SERPL-MCNC: 153 MG/DL
CO2 SERPL-SCNC: 27 MMOL/L (ref 21–32)
CREAT SERPL-MCNC: 0.84 MG/DL (ref 0.7–1.3)
CREAT UR-MCNC: 82.2 MG/DL
EST. AVERAGE GLUCOSE BLD GHB EST-MCNC: 226 MG/DL
GLUCOSE POC: NORMAL MG/DL
GLUCOSE SERPL-MCNC: 216 MG/DL (ref 65–100)
HBA1C MFR BLD: 9.5 % (ref 4.2–6.3)
HDLC SERPL-MCNC: 31 MG/DL
HDLC SERPL: 4.9 {RATIO} (ref 0–5)
LDLC SERPL CALC-MCNC: 94.6 MG/DL (ref 0–100)
LIPID PROFILE,FLP: NORMAL
MICROALBUMIN UR-MCNC: 7.07 MG/DL
MICROALBUMIN/CREAT UR-RTO: 86 MG/G (ref 0–30)
POTASSIUM SERPL-SCNC: 4.5 MMOL/L (ref 3.5–5.1)
SODIUM SERPL-SCNC: 139 MMOL/L (ref 136–145)
TRIGL SERPL-MCNC: 137 MG/DL (ref ?–150)
VLDLC SERPL CALC-MCNC: 27.4 MG/DL

## 2019-06-15 PROCEDURE — 83036 HEMOGLOBIN GLYCOSYLATED A1C: CPT

## 2019-06-15 PROCEDURE — 80048 BASIC METABOLIC PNL TOTAL CA: CPT

## 2019-06-15 PROCEDURE — 80061 LIPID PANEL: CPT

## 2019-06-15 PROCEDURE — 82043 UR ALBUMIN QUANTITATIVE: CPT

## 2019-06-15 PROCEDURE — 84153 ASSAY OF PSA TOTAL: CPT

## 2019-06-15 RX ORDER — CEPHALEXIN 500 MG/1
500 CAPSULE ORAL 4 TIMES DAILY
Qty: 40 CAP | Refills: 0 | Status: SHIPPED | OUTPATIENT
Start: 2019-06-15 | End: 2019-06-25

## 2019-06-15 NOTE — PROGRESS NOTES
Abi Parsons is a 48 y.o. male    Issues discussed today include:    Chief Complaint   Patient presents with    Diabetes    Other     bump on chest.        1) Left chest wall lump:  Has had a little spot on his left breast for 2 years. Then 1 week ago it became red and painful, has gotten bigger. No drainage. No fever, chills, nausea, vomiting. 2) DMII:  Has not been seen for DM in > 1 yr. Has been getting insulin at [facility], using novolin 70/30 as 25 units once daily in the am. Pinky Soles with getting labs today. Data reviewed or ordered today:       Other problems include:  Patient Active Problem List   Diagnosis Code    Type 2 diabetes mellitus without complication, without long-term current use of insulin ([facility] [address] E11.9    Type 2 diabetes with nephropathy ([facility]) E11.21       Medications:  Current Outpatient Medications   Medication Sig Dispense Refill    cephALEXin (KEFLEX) 500 mg capsule Take 1 Cap by mouth four (4) times daily for 10 days. [place] 1 tableta cuatro veces al keaton x 10 trent 40 Cap 0    naproxen (NAPROSYN) 500 mg tablet Take 1 Tab by mouth two (2) times daily (with meals). For pain 60 Tab 0    insulin NPH/insulin regular (NOVOLIN 70/30, HUMULIN 70/30) 100 unit/mL (70-30) injection 25 units sc 30 min ac lunch and dinner 10 mL 3    glipiZIDE (GLUCOTROL) 10 mg tablet Take 1 Tab by mouth two (2) times a day. 180 Tab 0    metFORMIN (GLUCOPHAGE) 1,000 mg tablet Take 1 Tab by mouth two (2) times daily (with meals). 180 Tab 0       Allergies:  No Known Allergies    LMP:  No LMP for male patient.     Social History     Socioeconomic History    Marital status: SINGLE     Spouse name: Not on file    Number of children: Not on file    Years of education: Not on file    Highest education level: Not on file   Occupational History    Not on file   Social Needs    Financial resource strain: Not on file    Food insecurity:     Worry: Not on file     Inability: Not on file    Transportation needs: Medical: Not on file     Non-medical: Not on file   Tobacco Use    Smoking status: Never Smoker    Smokeless tobacco: Never Used   Substance and Sexual Activity    Alcohol use: No     Alcohol/week: 0.0 oz    Drug use: No    Sexual activity: Not on file   Lifestyle    Physical activity:     Days per week: Not on file     Minutes per session: Not on file    Stress: Not on file   Relationships    Social connections:     Talks on phone: Not on file     Gets together: Not on file     Attends Anabaptist service: Not on file     Active member of club or organization: Not on file     Attends meetings of clubs or organizations: Not on file     Relationship status: Not on file    Intimate partner violence:     Fear of current or ex partner: Not on file     Emotionally abused: Not on file     Physically abused: Not on file     Forced sexual activity: Not on file   Other Topics Concern    Not on file   Social History Narrative    Not on file       Family History   Problem Relation Age of Onset    Diabetes Brother     Kidney Disease Brother         on dialysis    Diabetes Maternal Uncle     Heart Attack Maternal Uncle     Diabetes Other         maternal cousin         Physical Exam   Visit Vitals  /86 (BP 1 Location: Right arm)   Pulse 66   Temp 98.1 °F (36.7 °C) (Oral)   Ht 5' 3.78\" (1.62 m)   Wt 167 lb (75.8 kg)   BMI 28.86 kg/m²      BP Readings from Last 3 Encounters:   06/15/19 137/86   04/12/18 138/79   02/17/18 128/72     Constitutional: Appears well,  No acute distress, Vitals noted  Psychiatric:  Affect normal, Alert and Oriented to person/place/time  Eyes:  Conjunctiva clear, no drainage  ENT:  External ears and nose normal, Mucous membranes moist  Neck:  General inspection normal. Supple. Lungs:  No respiratory distress  Chest wall: medial left breast with 3 x 4 erythematous oblong indurated area with puntum to left side of area. + tenderness. No fluctuance.   Extremities: Without edema  Skin: Warm to palpation, without rashes    Lab Results   Component Value Date/Time    Glucose 196 (H) 05/06/2017 10:43 AM    Glucose POC  06/15/2019 09:29 AM     Lab Results   Component Value Date/Time    Hemoglobin A1c 10.0 (H) 05/06/2017 10:43 AM     Lab Results   Component Value Date/Time    Sodium 136 05/06/2017 10:43 AM    Potassium 4.3 05/06/2017 10:43 AM    Chloride 104 05/06/2017 10:43 AM    CO2 23 05/06/2017 10:43 AM    Anion gap 9 05/06/2017 10:43 AM    Glucose 196 (H) 05/06/2017 10:43 AM    BUN 14 05/06/2017 10:43 AM    Creatinine 0.67 (L) 05/06/2017 10:43 AM    BUN/Creatinine ratio 21 (H) 05/06/2017 10:43 AM    GFR est AA >60 05/06/2017 10:43 AM    GFR est non-AA >60 05/06/2017 10:43 AM    Calcium 8.8 05/06/2017 10:43 AM     Lab Results   Component Value Date/Time    Cholesterol, total 161 05/06/2017 10:43 AM    HDL Cholesterol 39 05/06/2017 10:43 AM    LDL, calculated 77.8 05/06/2017 10:43 AM    VLDL, calculated 44.2 05/06/2017 10:43 AM    Triglyceride 221 (H) 05/06/2017 10:43 AM    CHOL/HDL Ratio 4.1 05/06/2017 10:43 AM     Lab Results   Component Value Date/Time    Microalbumin/Creat ratio (mg/g creat) 50 (H) 05/06/2017 10:43 AM    Microalbumin,urine random 8.68 05/06/2017 10:43 AM       Assessment/Plan:      ICD-10-CM ICD-9-CM    1. Infected sebaceous cyst of skin L72.3 706.2 cephALEXin (KEFLEX) 500 mg capsule    L08.9     2. Type 2 diabetes mellitus without complication, without long-term current use of insulin (HCC) E11.9 250.00 HEMOGLOBIN A1C WITH EAG      METABOLIC PANEL, BASIC      LIPID PANEL      PSA W/ REFLX FREE PSA      MICROALBUMIN, UR, RAND W/ MICROALB/CREAT RATIO   3.  Encounter for screening Z13.9 V82.9 AMB POC GLUCOSE BLOOD, BY GLUCOSE MONITORING DEVICE       1) Infected sebaceous cyst  - Keflex (goodrx)  - Warm compresses q4 hrs  - AVS handout    2) IDDMII - long time out of care, still using insulin from   - A1c and other labs today, will call to adjust meds if needed  - 6 week f/u for DM and prostate concern      Follow-up and Dispositions    · Return in about 6 weeks (around 7/27/2019) for DM f/u appt and prostate concern.              Edie Neri MD  [address] [place] [address]

## 2019-06-15 NOTE — PATIENT INSTRUCTIONS
Quiste epidermoide: Instrucciones de cuidado - [ Epidermoid Cyst: Care Instructions ] Instrucciones de cuidado Un quiste epidérmico es un bulto kang debajo de la piel. Estos quistes pueden formarse cuando se bloquea un folículo piloso. Son comunes en el pete de acné y podrían ocurrir en la michelle, el lon, la espalda y los genitales. Sin embargo, se pueden formar en cualquier parte del cuerpo. Estos quistes no son cáncer y no producen cáncer. No tienden a ser dolorosos, nazia en ocasiones pueden llegar a hincharse y doler. También podrían desgarrarse (rupturar) y causar cicatrices. En ocasiones estos quistes no causan problemas y es posible que no requieran tratamiento. Si tiene un quiste adelfodo y [place], hensley médico podría inyectarle algún medicamento para ayudarlo a sanar. Nazia es más probable que un quiste que produce dolor deba ser [facility]. Hensley médico le administrará wenceslao inyección para anestesiarle y cortará dentro del quiste para drenarlo o extraerlo. [place] hace que desaparezcan los síntomas. La atención de seguimiento es wenceslao parte clave de hensley tratamiento y seguridad. Asegúrese de hacer y acudir a todas las citas, y llame a hensley médico si está teniendo problemas. También es wenceslao buena idea saber los resultados de carlee exámenes y mantener wenceslao lista de los medicamentos que michael. Cómo puede cuidarse en el hogar? · No exprima el quiste ni lo pinche con wenceslao aguja para abrirlo. [place] puede provocar hinchazón, enrojecimiento e infección. · [place] siempre que el médico observe cualquier bulto nuevo que tenga para asegurarse de que no sea grave. Cuándo debe pedir ayuda? Preste especial atención a los cambios en hensley camacho y asegúrese de comunicarse con hensley médico si: 
  · Tiene fiebre, enrojecimiento o hinchazón después de la aplicación de un medicamento inyectable en el quiste.  
  · Ve o siente un bulto nuevo en la piel. Dónde puede encontrar más información en inglés? Paco Juan Daniel a [email or web address]-shayla.info/. Danelle Martinezs S152 en la búsqueda para aprender más acerca de \"Quiste epidermoide: Instrucciones de cuidado - [ Epidermoid Cyst: Care Instructions ]. \" 
Revisado: 17 herrera, 2018 Versión del contenido: 11.9 © 8958-1514 Healthwise, Incorporated. Las instrucciones de cuidado fueron adaptadas bajo licencia por Good Help Connections (which disclaims liability or warranty for this information). Si usted tiene [place] afección médica o sobre estas instrucciones, siempre pregunte a hensley profesional de camacho. Healthwise, Incorporated niega toda garantía o responsabilidad por hensley uso de esta información.

## 2019-06-15 NOTE — PROGRESS NOTES
Check-out Note: 1) infected sebaceous cyst   - Keflex (goodrx)   - Warm compresses q4 hrs   - AVS handout     2) IDDMII - long time out of care, still using insulin from    - A1c and other labs today, will call to adjust meds if needed   - 6 week f/u for DM and prostate concern   Instrucciones para seguir     -Return in about 6 weeks (around 7/27/2019) for DM f/u appt and prostate concern. Pt called to nursing discharge. The registrar informed the nurse he had left the clinic.  Елена Burnette RN

## 2019-06-15 NOTE — PROGRESS NOTES
Coordination of Care  1. Have you been to the ER, urgent care clinic since your last visit? Hospitalized since your last visit? No    2. Have you seen or consulted any other health care providers outside of the [address] [place] [address] since your last visit? Include any pap smears or colon screening. No    Does the patient need refills? YES    Learning Assessment Complete? Yes  Depression Screening complete in the past 12 months?  YES    Results for orders placed or performed in visit on 06/15/19   AMB POC GLUCOSE BLOOD, BY GLUCOSE MONITORING DEVICE   Result Value Ref Range    Glucose POC  mg/dL

## 2019-06-16 DIAGNOSIS — E11.9 TYPE 2 DIABETES MELLITUS WITHOUT COMPLICATION, WITHOUT LONG-TERM CURRENT USE OF INSULIN (HCC): ICD-10-CM

## 2019-06-16 RX ORDER — METFORMIN HYDROCHLORIDE 1000 MG/1
1000 TABLET ORAL 2 TIMES DAILY WITH MEALS
Qty: 180 TAB | Refills: 1 | Status: SHIPPED | OUTPATIENT
Start: 2019-06-16

## 2019-06-16 NOTE — PROGRESS NOTES
Microalbumin remains > 30  ** Recommend adding low dose lisinopril, can begin with 5mg daily given BP generally at goal < 135/85 (rx sent)    A1c 9.5%, slightly improved from last check TWO YEARS ago, but not at goal  ** Recommend resuming metformin and increasing novolin 70/30 from 25 units qam to 25units qam with breakfast AND 10 units qpm with dinner (rx sent)    BMP wnl, no kidney or electrolyte problems identified, only high sugar as expected  Lipid panel at goal, should be on statin but will leave this discussion for his next follow up appt

## 2019-06-17 LAB
PSA SERPL-MCNC: 1.4 NG/ML (ref 0–4)
REFLEX CRITERIA: NORMAL

## 2019-06-18 NOTE — PROGRESS NOTES
PSA normal - pt would like to further discuss \"prostate\" issue at follow up appt, as we did not get to it at last visit

## 2019-07-08 NOTE — PROGRESS NOTES
I called pt today and gave message from the provider. Annalee Graham RN  Pt has follow up on 7/30/19 with Jose De Jesus Mota at 1:15. I told pt I would ask her to send in the Lisinopril RX that Dr. Rachelle Alexis said he needed but she forgot to send. Pt will wait to pick this up tomorrow afternoon.  Annalee Graham RN

## 2019-07-11 ENCOUNTER — OFFICE VISIT (OUTPATIENT)
Dept: FAMILY MEDICINE CLINIC | Age: 54
End: 2019-07-11

## 2019-07-11 VITALS
TEMPERATURE: 98.2 F | HEART RATE: 75 BPM | WEIGHT: 167 LBS | SYSTOLIC BLOOD PRESSURE: 147 MMHG | DIASTOLIC BLOOD PRESSURE: 86 MMHG | BODY MASS INDEX: 28.86 KG/M2

## 2019-07-11 DIAGNOSIS — I10 ESSENTIAL HYPERTENSION: ICD-10-CM

## 2019-07-11 DIAGNOSIS — E11.9 TYPE 2 DIABETES MELLITUS WITHOUT COMPLICATION, WITHOUT LONG-TERM CURRENT USE OF INSULIN (HCC): Primary | ICD-10-CM

## 2019-07-11 DIAGNOSIS — L08.9 INFECTED SEBACEOUS CYST OF SKIN: ICD-10-CM

## 2019-07-11 DIAGNOSIS — L72.3 INFECTED SEBACEOUS CYST OF SKIN: ICD-10-CM

## 2019-07-11 LAB — GLUCOSE POC: NORMAL MG/DL

## 2019-07-11 RX ORDER — SULFAMETHOXAZOLE AND TRIMETHOPRIM 800; 160 MG/1; MG/1
1 TABLET ORAL 2 TIMES DAILY
Qty: 20 TAB | Refills: 0 | Status: SHIPPED | OUTPATIENT
Start: 2019-07-11 | End: 2019-07-21

## 2019-07-11 RX ORDER — CEPHALEXIN 500 MG/1
500 CAPSULE ORAL 2 TIMES DAILY
Qty: 20 CAP | Refills: 0 | Status: SHIPPED | OUTPATIENT
Start: 2019-07-11 | End: 2019-07-21

## 2019-07-11 RX ORDER — LISINOPRIL 10 MG/1
10 TABLET ORAL DAILY
Qty: 90 TAB | Refills: 3 | Status: SHIPPED | OUTPATIENT
Start: 2019-07-11

## 2019-07-11 NOTE — PROGRESS NOTES
HISTORY OF PRESENT ILLNESS  Renate Weathers is a 48 y.o. male. HPI  1. Patient states he received antibiotic for infected cyst on his chest last month. The cyst is still red and tender. Would like to know if he can be given more antibiotics. 2. Received a call with his results, was told to come back to the office to discussed adding a medication for renal protection. Review of Systems   Constitutional: Negative for chills, fever and weight loss. HENT: Negative for ear pain, hearing loss and tinnitus. Eyes: Negative for blurred vision, double vision and photophobia. Respiratory: Negative for cough, hemoptysis and sputum production. Cardiovascular: Negative for chest pain, palpitations, orthopnea and claudication. Gastrointestinal: Negative for abdominal pain, diarrhea, heartburn, nausea and vomiting. Genitourinary: Negative for dysuria, frequency and urgency. Musculoskeletal: Negative for back pain, myalgias and neck pain. Skin: Negative for itching and rash. Infected sebaceous cyst, left side of chest   Neurological: Negative for dizziness, tingling, tremors and headaches. Psychiatric/Behavioral: Negative for depression, substance abuse and suicidal ideas. /86 (BP 1 Location: Right arm, BP Patient Position: Sitting)   Pulse 75   Temp 98.2 °F (36.8 °C) (Oral)   Wt 167 lb (75.8 kg)   BMI 28.86 kg/m²   Physical Exam   Constitutional: He appears well-developed and well-nourished. HENT:   Head: Normocephalic. Right Ear: External ear normal.   Left Ear: External ear normal.   Eyes: Pupils are equal, round, and reactive to light. Neck: Normal range of motion. No thyromegaly present. Cardiovascular: Normal rate, regular rhythm and normal heart sounds. No murmur heard. Pulmonary/Chest: Effort normal and breath sounds normal. No respiratory distress. He has no wheezes. He has no rales. Abdominal: Soft.  Bowel sounds are normal.   Musculoskeletal: Normal range of motion. He exhibits no edema. Neurological: He is alert. No cranial nerve deficit. Coordination normal.   Skin: Skin is warm. There is erythema. Erythema, tenderness and warmth over sebaceous cyst on left side of chest       ASSESSMENT and PLAN  Diagnoses and all orders for this visit:    1. Type 2 diabetes mellitus without complication, without long-term current use of insulin ([facility])  -     AMB POC GLUCOSE BLOOD, BY GLUCOSE MONITORING DEVICE    2. Infected sebaceous cyst of skin  -     trimethoprim-sulfamethoxazole (BACTRIM DS, SEPTRA DS) 160-800 mg per tablet; Take 1 Tab by mouth two (2) times a day for 10 days. -     cephALEXin (KEFLEX) 500 mg capsule; Take 1 Cap by mouth two (2) times a day for 10 days. 3. Essential hypertension  -     lisinopril (PRINIVIL, ZESTRIL) 10 mg tablet; Take 1 Tab by mouth daily. We will start lisinopril  Will cover with bactrim and keflex.   Surgery referral offered,  Patient would like to wait  Follow up in 3 months

## 2019-07-11 NOTE — PROGRESS NOTES
The following was performed at discharge station per provider:   AVS printed, reviewed with pt and given to pt. RN reviewed all prescriptions ordered today:  Bactrim, Cephalexin, and Lisinopril. RN printed Good RX coupons for Cephalexin and Bactrim. Pt has follow up visit scheduled in 10/19. Pt expressed understanding of the above information. Brainna Jansen

## 2019-07-11 NOTE — PROGRESS NOTES
Coordination of Care  1. Have you been to the ER, urgent care clinic since your last visit? Hospitalized since your last visit? No    2. Have you seen or consulted any other health care providers outside of the [address] [place] [address] since your last visit? Include any pap smears or colon screening. No    Does the patient need refills?  YES    Learning Assessment Complete? yes   Results for orders placed or performed in visit on 07/11/19   AMB POC GLUCOSE BLOOD, BY GLUCOSE MONITORING DEVICE   Result Value Ref Range    Glucose  F mg/dL

## 2022-03-19 PROBLEM — E11.21 TYPE 2 DIABETES WITH NEPHROPATHY (HCC): Status: ACTIVE | Noted: 2018-02-17

## 2022-03-20 PROBLEM — E11.9 TYPE 2 DIABETES MELLITUS WITHOUT COMPLICATION, WITHOUT LONG-TERM CURRENT USE OF INSULIN (HCC): Status: ACTIVE | Noted: 2017-02-11

## 2024-11-19 ENCOUNTER — TELEMEDICINE (OUTPATIENT)
Age: 59
End: 2024-11-19

## 2024-11-19 DIAGNOSIS — R80.9 TYPE 2 DIABETES MELLITUS WITH DIABETIC MICROALBUMINURIA, WITH LONG-TERM CURRENT USE OF INSULIN (HCC): Primary | ICD-10-CM

## 2024-11-19 DIAGNOSIS — E11.29 TYPE 2 DIABETES MELLITUS WITH DIABETIC MICROALBUMINURIA, WITH LONG-TERM CURRENT USE OF INSULIN (HCC): Primary | ICD-10-CM

## 2024-11-19 DIAGNOSIS — Z79.4 TYPE 2 DIABETES MELLITUS WITH DIABETIC MICROALBUMINURIA, WITH LONG-TERM CURRENT USE OF INSULIN (HCC): Primary | ICD-10-CM

## 2024-11-19 PROCEDURE — 99442 PR PHYS/QHP TELEPHONE EVALUATION 11-20 MIN: CPT | Performed by: FAMILY MEDICINE

## 2024-11-19 ASSESSMENT — PATIENT HEALTH QUESTIONNAIRE - PHQ9
SUM OF ALL RESPONSES TO PHQ QUESTIONS 1-9: 0
SUM OF ALL RESPONSES TO PHQ9 QUESTIONS 1 & 2: 0
2. FEELING DOWN, DEPRESSED OR HOPELESS: NOT AT ALL
SUM OF ALL RESPONSES TO PHQ QUESTIONS 1-9: 0
1. LITTLE INTEREST OR PLEASURE IN DOING THINGS: NOT AT ALL
SUM OF ALL RESPONSES TO PHQ QUESTIONS 1-9: 0
SUM OF ALL RESPONSES TO PHQ QUESTIONS 1-9: 0

## 2024-11-19 ASSESSMENT — ENCOUNTER SYMPTOMS
SHORTNESS OF BREATH: 0
COUGH: 0

## 2024-11-19 NOTE — PROGRESS NOTES
\"Have you been to the ER, urgent care clinic since your last visit?  Hospitalized since your last visit?\"    NO    “Have you seen or consulted any other health care providers outside our system since your last visit?”    NO      “Have you had a colorectal cancer screening such as a colonoscopy/FIT/Cologuard?    NO    No colonoscopy on file  No cologuard on file  No FIT/FOBT on file   No flexible sigmoidoscopy on file     “Have you had a diabetic eye exam?”    NO     No diabetic eye exam on file            Chief Complaint   Patient presents with    Diabetes     Follow up    Other     Pt reports getting \"shakey\" intermittently to lower body

## 2024-11-19 NOTE — PROGRESS NOTES
Guicho Domingo (: 1965) is a 59 y.o. male, Established patient, Virtual Visit for evaluation of the following chief complaint(s):   Diabetes (Follow up) and Other (Pt reports getting \"shakey\" intermittently to lower body)       ASSESSMENT/PLAN:  1. Type 2 diabetes mellitus with diabetic microalbuminuria, with long-term current use of insulin ([facility])  Likely uncontrolled, will need F2F for check up and labs.    Return for follow up F2F next available for DM check with any provider (chronic care OK).    SUBJECTIVE/OBJECTIVE:  VV for follow up.  Not seen since 2019.  DM:  Patient was using Novolin 70/30 25U Noon, 30U PM at last visit but felt he was having hypoglycemic spell: spells of weakness and cold sweats.  He started using it just once a day, usually in afternoon 25-30U and no longer has hypoglycemic spells.  He has been getting insulin OTC and so that is why he has not made a follow up appt.  Does not check his BG regularly.  Has not had any medical care in the past 5 years.  Is no longer taking lisinopril.  He started noticing more foaming in the urine and wanted to have his urine checked again.  His leg felt shaky last week when he got up in the morning.  Sx resolved but he felt he should be seen again.  Has slowly lost weight in the past few years.    Review of Systems   Constitutional:  Positive for unexpected weight change. Negative for chills, diaphoresis and fever.   Respiratory:  Negative for cough and shortness of breath.    Endocrine: Negative for polydipsia and polyuria.   Genitourinary:  Negative for difficulty urinating, dysuria and frequency.      Patient-Reported Vitals  No data recorded     Physical Exam    On this date 2024 I have spent 14 minutes reviewing previous notes, test results and face to face (virtual) with the patient discussing the diagnosis and importance of compliance with the treatment plan as well as documenting on the day of the visit.  Guicho Palumbo

## 2024-12-09 ENCOUNTER — TELEPHONE (OUTPATIENT)
Age: 59
End: 2024-12-09

## 2024-12-09 NOTE — TELEPHONE ENCOUNTER
Tc to the pt to schedule an appt. RN attempted to call the pt 2x. It goes straight into voicemail. I left a message to call this cbn back on the cbn mobile phone. Vero Bradford RN